# Patient Record
Sex: FEMALE | URBAN - METROPOLITAN AREA
[De-identification: names, ages, dates, MRNs, and addresses within clinical notes are randomized per-mention and may not be internally consistent; named-entity substitution may affect disease eponyms.]

---

## 2017-06-09 ENCOUNTER — OUTPATIENT (OUTPATIENT)
Dept: OUTPATIENT SERVICES | Facility: HOSPITAL | Age: 47
LOS: 1 days | End: 2017-06-09
Payer: COMMERCIAL

## 2017-06-09 ENCOUNTER — TRANSCRIPTION ENCOUNTER (OUTPATIENT)
Age: 47
End: 2017-06-09

## 2017-06-09 DIAGNOSIS — M50.120 MID-CERVICAL DISC DISORDER, UNSPECIFIED LEVEL: ICD-10-CM

## 2017-06-09 PROCEDURE — 77003 FLUOROGUIDE FOR SPINE INJECT: CPT

## 2017-06-09 PROCEDURE — 62321 NJX INTERLAMINAR CRV/THRC: CPT

## 2017-06-23 ENCOUNTER — TRANSCRIPTION ENCOUNTER (OUTPATIENT)
Age: 47
End: 2017-06-23

## 2017-06-23 ENCOUNTER — OUTPATIENT (OUTPATIENT)
Dept: OUTPATIENT SERVICES | Facility: HOSPITAL | Age: 47
LOS: 1 days | End: 2017-06-23
Payer: COMMERCIAL

## 2017-06-23 DIAGNOSIS — M54.16 RADICULOPATHY, LUMBAR REGION: ICD-10-CM

## 2017-06-23 PROCEDURE — 77003 FLUOROGUIDE FOR SPINE INJECT: CPT

## 2017-06-23 PROCEDURE — 62323 NJX INTERLAMINAR LMBR/SAC: CPT

## 2017-08-22 ENCOUNTER — TRANSCRIPTION ENCOUNTER (OUTPATIENT)
Age: 47
End: 2017-08-22

## 2017-08-22 ENCOUNTER — OUTPATIENT (OUTPATIENT)
Dept: OUTPATIENT SERVICES | Facility: HOSPITAL | Age: 47
LOS: 1 days | End: 2017-08-22
Payer: COMMERCIAL

## 2017-08-22 DIAGNOSIS — M54.16 RADICULOPATHY, LUMBAR REGION: ICD-10-CM

## 2017-09-08 ENCOUNTER — OUTPATIENT (OUTPATIENT)
Dept: OUTPATIENT SERVICES | Facility: HOSPITAL | Age: 47
LOS: 1 days | End: 2017-09-08
Payer: COMMERCIAL

## 2017-09-08 ENCOUNTER — TRANSCRIPTION ENCOUNTER (OUTPATIENT)
Age: 47
End: 2017-09-08

## 2017-09-08 DIAGNOSIS — M50.120 MID-CERVICAL DISC DISORDER, UNSPECIFIED LEVEL: ICD-10-CM

## 2017-09-08 PROCEDURE — 62321 NJX INTERLAMINAR CRV/THRC: CPT

## 2017-09-08 PROCEDURE — 62323 NJX INTERLAMINAR LMBR/SAC: CPT

## 2017-09-08 PROCEDURE — 77003 FLUOROGUIDE FOR SPINE INJECT: CPT

## 2018-03-06 PROBLEM — Z00.00 ENCOUNTER FOR PREVENTIVE HEALTH EXAMINATION: Status: ACTIVE | Noted: 2018-03-06

## 2018-03-08 ENCOUNTER — APPOINTMENT (OUTPATIENT)
Dept: INTERNAL MEDICINE | Facility: CLINIC | Age: 48
End: 2018-03-08
Payer: COMMERCIAL

## 2018-03-08 PROCEDURE — 99205 OFFICE O/P NEW HI 60 MIN: CPT

## 2018-05-22 ENCOUNTER — APPOINTMENT (OUTPATIENT)
Dept: INTERNAL MEDICINE | Facility: CLINIC | Age: 48
End: 2018-05-22
Payer: COMMERCIAL

## 2018-05-22 PROCEDURE — 99214 OFFICE O/P EST MOD 30 MIN: CPT

## 2023-03-17 ENCOUNTER — OFFICE (OUTPATIENT)
Dept: URBAN - METROPOLITAN AREA CLINIC 104 | Facility: CLINIC | Age: 53
Setting detail: OPHTHALMOLOGY
End: 2023-03-17
Payer: COMMERCIAL

## 2023-03-17 ENCOUNTER — RX ONLY (RX ONLY)
Age: 53
End: 2023-03-17

## 2023-03-17 DIAGNOSIS — H01.004: ICD-10-CM

## 2023-03-17 DIAGNOSIS — G43.009: ICD-10-CM

## 2023-03-17 DIAGNOSIS — H01.005: ICD-10-CM

## 2023-03-17 DIAGNOSIS — H25.13: ICD-10-CM

## 2023-03-17 DIAGNOSIS — H52.4: ICD-10-CM

## 2023-03-17 DIAGNOSIS — H01.002: ICD-10-CM

## 2023-03-17 DIAGNOSIS — H01.001: ICD-10-CM

## 2023-03-17 DIAGNOSIS — H16.223: ICD-10-CM

## 2023-03-17 PROCEDURE — 92015 DETERMINE REFRACTIVE STATE: CPT | Performed by: OPTOMETRIST

## 2023-03-17 PROCEDURE — 92014 COMPRE OPH EXAM EST PT 1/>: CPT | Performed by: OPTOMETRIST

## 2023-03-17 ASSESSMENT — REFRACTION_CURRENTRX
OD_ADD: +2.00
OS_ADD: +2.00
OS_AXIS: 137
OD_OVR_VA: 20/
OD_CYLINDER: -0.25
OS_SPHERE: PLANO
OD_AXIS: 044
OS_CYLINDER: -0.25
OS_OVR_VA: 20/
OD_SPHERE: +0.50

## 2023-03-17 ASSESSMENT — REFRACTION_MANIFEST
OD_AXIS: 044
OS_CYLINDER: -0.25
OD_ADD: +2.00
OS_AXIS: 137
OD_SPHERE: +0.50
OS_SPHERE: PLANO
OD_CYLINDER: -0.25
OS_ADD: +2.00

## 2023-03-17 ASSESSMENT — KERATOMETRY
OD_K2POWER_DIOPTERS: 46.30
OD_K1POWER_DIOPTERS: 45.73
OS_K2POWER_DIOPTERS: 46.49
OS_K1POWER_DIOPTERS: 45.55
OS_AXISANGLE_DEGREES: 083
OD_AXISANGLE_DEGREES: 102

## 2023-03-17 ASSESSMENT — LID EXAM ASSESSMENTS
OD_BLEPHARITIS: RLL RUL 1+
OS_BLEPHARITIS: LLL LUL 1+ 2+

## 2023-03-17 ASSESSMENT — REFRACTION_AUTOREFRACTION
OD_CYLINDER: -0.50
OD_SPHERE: +0.75
OD_AXIS: 003
OS_SPHERE: PLANO
OS_CYLINDER: SPHERE

## 2023-03-17 ASSESSMENT — VISUAL ACUITY
OD_BCVA: 20/20
OS_BCVA: 20/20-1

## 2023-03-17 ASSESSMENT — AXIALLENGTH_DERIVED
OD_AL: 22.5684
OD_AL: 22.524

## 2023-03-17 ASSESSMENT — SPHEQUIV_DERIVED
OD_SPHEQUIV: 0.5
OD_SPHEQUIV: 0.375

## 2023-03-17 ASSESSMENT — CONFRONTATIONAL VISUAL FIELD TEST (CVF)
OS_FINDINGS: FULL
OD_FINDINGS: FULL

## 2023-03-17 ASSESSMENT — TONOMETRY
OS_IOP_MMHG: 16
OD_IOP_MMHG: 16

## 2023-03-17 ASSESSMENT — SUPERFICIAL PUNCTATE KERATITIS (SPK)
OD_SPK: 1+
OS_SPK: 1+

## 2023-12-20 ENCOUNTER — NON-APPOINTMENT (OUTPATIENT)
Age: 53
End: 2023-12-20

## 2024-03-22 ENCOUNTER — OFFICE (OUTPATIENT)
Dept: URBAN - METROPOLITAN AREA CLINIC 104 | Facility: CLINIC | Age: 54
Setting detail: OPHTHALMOLOGY
End: 2024-03-22
Payer: COMMERCIAL

## 2024-03-22 DIAGNOSIS — H16.223: ICD-10-CM

## 2024-03-22 DIAGNOSIS — H01.004: ICD-10-CM

## 2024-03-22 DIAGNOSIS — H01.005: ICD-10-CM

## 2024-03-22 DIAGNOSIS — G43.009: ICD-10-CM

## 2024-03-22 DIAGNOSIS — H25.13: ICD-10-CM

## 2024-03-22 DIAGNOSIS — H01.001: ICD-10-CM

## 2024-03-22 DIAGNOSIS — H01.002: ICD-10-CM

## 2024-03-22 PROBLEM — H10.45 ALLERGIC CONJUNCTIVITIS: Status: ACTIVE | Noted: 2024-03-22

## 2024-03-22 PROCEDURE — 92014 COMPRE OPH EXAM EST PT 1/>: CPT | Performed by: OPTOMETRIST

## 2024-08-15 DIAGNOSIS — Z12.39 ENCOUNTER FOR OTHER SCREENING FOR MALIGNANT NEOPLASM OF BREAST: ICD-10-CM

## 2024-08-15 DIAGNOSIS — Z12.11 ENCOUNTER FOR SCREENING FOR MALIGNANT NEOPLASM OF COLON: ICD-10-CM

## 2024-08-16 ENCOUNTER — APPOINTMENT (OUTPATIENT)
Dept: OBGYN | Facility: CLINIC | Age: 54
End: 2024-08-16
Payer: COMMERCIAL

## 2024-08-16 VITALS
DIASTOLIC BLOOD PRESSURE: 83 MMHG | BODY MASS INDEX: 35.34 KG/M2 | WEIGHT: 180 LBS | HEIGHT: 60 IN | SYSTOLIC BLOOD PRESSURE: 125 MMHG

## 2024-08-16 DIAGNOSIS — Z86.19 PERSONAL HISTORY OF OTHER INFECTIOUS AND PARASITIC DISEASES: ICD-10-CM

## 2024-08-16 DIAGNOSIS — Z00.00 ENCOUNTER FOR GENERAL ADULT MEDICAL EXAMINATION W/OUT ABNORMAL FINDINGS: ICD-10-CM

## 2024-08-16 DIAGNOSIS — Z86.59 PERSONAL HISTORY OF OTHER MENTAL AND BEHAVIORAL DISORDERS: ICD-10-CM

## 2024-08-16 DIAGNOSIS — Z87.39 PERSONAL HISTORY OF OTHER DISEASES OF THE MUSCULOSKELETAL SYSTEM AND CONNECTIVE TISSUE: ICD-10-CM

## 2024-08-16 DIAGNOSIS — Z86.39 PERSONAL HISTORY OF OTHER ENDOCRINE, NUTRITIONAL AND METABOLIC DISEASE: ICD-10-CM

## 2024-08-16 PROCEDURE — 99396 PREV VISIT EST AGE 40-64: CPT

## 2024-08-16 PROCEDURE — 99386 PREV VISIT NEW AGE 40-64: CPT

## 2024-08-16 NOTE — COUNSELING
[Nutrition/ Exercise/ Weight Management] : nutrition, exercise, weight management [Vitamins/Supplements] : vitamins/supplements [Vaccines] : vaccines [Medication Management] : medication management

## 2024-08-16 NOTE — PLAN
[FreeTextEntry1] : Wellness exam.  Normal physical examination. Recommendations: Mammography, bilateral breast ultrasound and dermatological examination. Status post colonoscopy in 2022.  Discussed proper nutrition and physical exercise. Reviewed age-appropriate vaccinations.  Family history significant for breast cancer.  Information given on genetic testing.  Musculoskeletal pain.  Recently diagnosed with Lyme disease.  Followed by internal medicine physician.

## 2024-08-16 NOTE — PHYSICAL EXAM
[Chaperone Present] : A chaperone was present in the examining room during all aspects of the physical examination [Appropriately responsive] : appropriately responsive [Alert] : alert [No Acute Distress] : no acute distress [No Lymphadenopathy] : no lymphadenopathy [Tender] : tender [Soft] : soft [Non-tender] : non-tender [Non-distended] : non-distended [Oriented x3] : oriented x3 [Examination Of The Breasts] : a normal appearance [No Masses] : no breast masses were palpable [Labia Majora] : normal [Labia Minora] : normal [Normal] : normal [Uterine Adnexae] : normal [FreeTextEntry2] : Aby Jeffries [FreeTextEntry6] : Soft.  Fibrocystic and glandular.  Nontender.  No predominant nodules or lymphadenopathy [Vulvar Atrophy] : vulvar atrophy [FreeTextEntry5] : A Pap smear was obtained [FreeTextEntry9] : no palpable masses

## 2024-08-17 RX ORDER — NYSTATIN 100000 U/G
100000 OINTMENT TOPICAL 3 TIMES DAILY
Qty: 1 | Refills: 1 | Status: ACTIVE | COMMUNITY
Start: 2024-08-17 | End: 1900-01-01

## 2024-08-17 RX ORDER — TRIAMCINOLONE ACETONIDE 1 MG/G
0.1 OINTMENT TOPICAL TWICE DAILY
Qty: 60 | Refills: 1 | Status: ACTIVE | COMMUNITY
Start: 2024-08-17 | End: 1900-01-01

## 2024-08-19 LAB — HPV HIGH+LOW RISK DNA PNL CVX: NOT DETECTED

## 2024-08-21 LAB — CYTOLOGY CVX/VAG DOC THIN PREP: ABNORMAL

## 2024-12-30 ENCOUNTER — NON-APPOINTMENT (OUTPATIENT)
Age: 54
End: 2024-12-30

## 2025-02-21 ENCOUNTER — RX RENEWAL (OUTPATIENT)
Age: 55
End: 2025-02-21

## 2025-03-28 ENCOUNTER — RX ONLY (RX ONLY)
Age: 55
End: 2025-03-28

## 2025-03-28 ENCOUNTER — OFFICE (OUTPATIENT)
Dept: URBAN - METROPOLITAN AREA CLINIC 104 | Facility: CLINIC | Age: 55
Setting detail: OPHTHALMOLOGY
End: 2025-03-28
Payer: COMMERCIAL

## 2025-03-28 DIAGNOSIS — H25.13: ICD-10-CM

## 2025-03-28 DIAGNOSIS — H16.223: ICD-10-CM

## 2025-03-28 DIAGNOSIS — H52.4: ICD-10-CM

## 2025-03-28 DIAGNOSIS — H10.45: ICD-10-CM

## 2025-03-28 PROCEDURE — 92015 DETERMINE REFRACTIVE STATE: CPT | Performed by: OPTOMETRIST

## 2025-03-28 PROCEDURE — 92014 COMPRE OPH EXAM EST PT 1/>: CPT | Performed by: OPTOMETRIST

## 2025-03-28 ASSESSMENT — CONFRONTATIONAL VISUAL FIELD TEST (CVF)
OD_FINDINGS: FULL
OS_FINDINGS: FULL

## 2025-03-28 ASSESSMENT — REFRACTION_MANIFEST
OS_ADD: +1.75
OD_VA2: 20/20(J1+)
OS_VA1: 20/20
OD_VA1: 20/20
OD_CYLINDER: SPH
OS_SPHERE: PLANO
OS_VA2: 20/20(J1+)
OD_SPHERE: +1.00
OD_ADD: +1.75

## 2025-03-28 ASSESSMENT — VISUAL ACUITY
OS_BCVA: 20/25-1
OD_BCVA: 20/20

## 2025-03-28 ASSESSMENT — KERATOMETRY
OS_K2POWER_DIOPTERS: 46.36
OS_K1POWER_DIOPTERS: 45.67
OD_AXISANGLE_DEGREES: 110
OD_K1POWER_DIOPTERS: 45.61
OS_AXISANGLE_DEGREES: 079
OD_K2POWER_DIOPTERS: 46.23

## 2025-03-28 ASSESSMENT — REFRACTION_AUTOREFRACTION
OS_CYLINDER: -0.25
OD_AXIS: 037
OS_AXIS: 111
OD_CYLINDER: -0.50
OD_SPHERE: +1.00
OS_SPHERE: +0.25

## 2025-03-28 ASSESSMENT — SUPERFICIAL PUNCTATE KERATITIS (SPK)
OS_SPK: 1+
OD_SPK: 1+